# Patient Record
Sex: FEMALE | Race: WHITE | NOT HISPANIC OR LATINO | Employment: PART TIME | URBAN - METROPOLITAN AREA
[De-identification: names, ages, dates, MRNs, and addresses within clinical notes are randomized per-mention and may not be internally consistent; named-entity substitution may affect disease eponyms.]

---

## 2023-05-26 ENCOUNTER — OFFICE VISIT (OUTPATIENT)
Dept: PRIMARY CARE | Facility: CLINIC | Age: 28
End: 2023-05-26
Payer: COMMERCIAL

## 2023-05-26 VITALS
BODY MASS INDEX: 29.89 KG/M2 | DIASTOLIC BLOOD PRESSURE: 62 MMHG | SYSTOLIC BLOOD PRESSURE: 90 MMHG | WEIGHT: 186 LBS | OXYGEN SATURATION: 97 % | HEIGHT: 66 IN | HEART RATE: 70 BPM

## 2023-05-26 DIAGNOSIS — M25.512 CHRONIC PAIN OF BOTH SHOULDERS: ICD-10-CM

## 2023-05-26 DIAGNOSIS — L98.9 SKIN LESION: ICD-10-CM

## 2023-05-26 DIAGNOSIS — M79.7 FIBROMYALGIA: Primary | ICD-10-CM

## 2023-05-26 DIAGNOSIS — M25.511 CHRONIC PAIN OF BOTH SHOULDERS: ICD-10-CM

## 2023-05-26 DIAGNOSIS — E66.09 CLASS 1 OBESITY DUE TO EXCESS CALORIES WITHOUT SERIOUS COMORBIDITY WITH BODY MASS INDEX (BMI) OF 30.0 TO 30.9 IN ADULT: ICD-10-CM

## 2023-05-26 DIAGNOSIS — R20.2 PARESTHESIA OF BOTH HANDS: ICD-10-CM

## 2023-05-26 DIAGNOSIS — G89.29 CHRONIC PAIN OF BOTH SHOULDERS: ICD-10-CM

## 2023-05-26 PROBLEM — M25.551 HIP PAIN, RIGHT: Status: ACTIVE | Noted: 2023-05-26

## 2023-05-26 PROBLEM — D64.9 ANEMIA: Status: ACTIVE | Noted: 2023-05-26

## 2023-05-26 PROBLEM — L30.1 DYSHIDROTIC ECZEMA: Status: ACTIVE | Noted: 2023-05-26

## 2023-05-26 PROBLEM — R22.2 LUMP OF SKIN OF BACK: Status: ACTIVE | Noted: 2023-05-26

## 2023-05-26 PROBLEM — R53.83 FATIGUE: Status: ACTIVE | Noted: 2023-05-26

## 2023-05-26 PROBLEM — F41.9 ANXIETY DISORDER: Status: ACTIVE | Noted: 2023-05-26

## 2023-05-26 PROBLEM — N63.0 LUMP OR MASS IN BREAST: Status: ACTIVE | Noted: 2023-05-26

## 2023-05-26 PROBLEM — G43.909 MIGRAINE: Status: ACTIVE | Noted: 2023-05-26

## 2023-05-26 PROBLEM — N91.2 AMENORRHEA: Status: ACTIVE | Noted: 2023-05-26

## 2023-05-26 PROBLEM — M54.50 LOW BACK PAIN: Status: ACTIVE | Noted: 2023-05-26

## 2023-05-26 PROCEDURE — 99213 OFFICE O/P EST LOW 20 MIN: CPT | Performed by: NURSE PRACTITIONER

## 2023-05-26 PROCEDURE — 1036F TOBACCO NON-USER: CPT | Performed by: NURSE PRACTITIONER

## 2023-05-26 PROCEDURE — 3008F BODY MASS INDEX DOCD: CPT | Performed by: NURSE PRACTITIONER

## 2023-05-26 RX ORDER — CETIRIZINE HYDROCHLORIDE 10 MG/1
10 TABLET ORAL DAILY
COMMUNITY
Start: 2022-12-26

## 2023-05-26 RX ORDER — MOMETASONE FUROATE 1 MG/G
OINTMENT TOPICAL
COMMUNITY
Start: 2023-01-06 | End: 2023-05-26 | Stop reason: ALTCHOICE

## 2023-05-26 RX ORDER — RUXOLITINIB 15 MG/G
CREAM TOPICAL 2 TIMES DAILY
COMMUNITY

## 2023-05-26 ASSESSMENT — PATIENT HEALTH QUESTIONNAIRE - PHQ9
1. LITTLE INTEREST OR PLEASURE IN DOING THINGS: NOT AT ALL
2. FEELING DOWN, DEPRESSED OR HOPELESS: NOT AT ALL
SUM OF ALL RESPONSES TO PHQ9 QUESTIONS 1 AND 2: 0

## 2023-05-26 NOTE — PROGRESS NOTES
"Subjective   Patient ID: Al Nuñez is a 28 y.o. female who presents for check up and EMG follow up.      HPI  Al returns for follow up and concerns of still having shoulder pain. Also skin lesion concern.     Right shoulder pain/left shoulder pain/neck pain:  Reviewed EMG results with patient in regards to mild nerve entrapment to right. Reports that she is still having pain in both arms with numbness/tingling.       Skin lesion: left upper chest. Is worried would like to get it checked out. Reports it gets caught on things. Family history of skin cancer in mother.       Review of Systems   Constitutional:  Negative for fatigue.   Respiratory:  Negative for chest tightness and shortness of breath.    Cardiovascular:  Negative for chest pain, palpitations and leg swelling.   Gastrointestinal:  Negative for abdominal pain, blood in stool, constipation, diarrhea, nausea and vomiting.   Genitourinary:  Negative for dysuria.   Musculoskeletal:  Positive for arthralgias, myalgias and neck pain.   Skin:  Negative for color change.        Skin lesion right upper chest   Neurological:  Negative for dizziness, light-headedness and headaches.       Objective   Vitals:    05/26/23 1055   BP: 90/62   BP Location: Left arm   Pulse: 70   SpO2: 97%   Weight: 84.4 kg (186 lb)   Height: 1.676 m (5' 6\")      Physical Exam  Vitals and nursing note reviewed.   Constitutional:       Appearance: Normal appearance.   HENT:      Head: Normocephalic and atraumatic.   Pulmonary:      Effort: Pulmonary effort is normal.   Skin:            Comments: 0.5 cm brown colored skin lesion on right upper chest.    Neurological:      Mental Status: She is alert and oriented to person, place, and time.   Psychiatric:         Mood and Affect: Mood normal.         Behavior: Behavior normal.         Thought Content: Thought content normal.         Judgment: Judgment normal.         Assessment/Plan   Diagnoses and all orders for this " visit:  Fibromyalgia  Skin lesion  Comments:  advised her to call her dermatologist  Chronic pain of both shoulders  Paresthesia of both hands  Class 1 obesity due to excess calories without serious comorbidity with body mass index (BMI) of 30.0 to 30.9 in adult      Problem List Items Addressed This Visit       Chronic pain of both shoulders    Fibromyalgia - Primary    Paresthesia of both hands    Class 1 obesity due to excess calories without serious comorbidity with body mass index (BMI) of 30.0 to 30.9 in adult     Other Visit Diagnoses       Skin lesion        advised her to call her dermatologist          We discussed that we can send her to pain mgmt, she refused. We discussed alternative pain mgmt such as massage, chiropractor care, acupuncture.

## 2023-05-27 PROBLEM — E66.09 CLASS 1 OBESITY DUE TO EXCESS CALORIES WITHOUT SERIOUS COMORBIDITY WITH BODY MASS INDEX (BMI) OF 30.0 TO 30.9 IN ADULT: Status: ACTIVE | Noted: 2023-05-27

## 2023-05-27 PROBLEM — G54.0 BRACHIAL PLEXUS DISORDERS: Status: RESOLVED | Noted: 2018-03-06 | Resolved: 2023-05-27

## 2023-05-27 PROBLEM — R20.2 PARESTHESIA OF BOTH HANDS: Status: ACTIVE | Noted: 2018-03-06

## 2023-05-27 PROBLEM — G56.03 BILATERAL CARPAL TUNNEL SYNDROME: Status: RESOLVED | Noted: 2018-03-06 | Resolved: 2023-05-27

## 2023-05-27 PROBLEM — M75.20 BICEPS TENDINITIS: Status: RESOLVED | Noted: 2018-03-06 | Resolved: 2023-05-27

## 2023-05-27 PROBLEM — E66.811 CLASS 1 OBESITY DUE TO EXCESS CALORIES WITHOUT SERIOUS COMORBIDITY WITH BODY MASS INDEX (BMI) OF 30.0 TO 30.9 IN ADULT: Status: ACTIVE | Noted: 2023-05-27

## 2023-05-27 PROBLEM — R53.83 FATIGUE: Status: RESOLVED | Noted: 2023-05-26 | Resolved: 2023-05-27

## 2023-05-27 PROBLEM — M25.511 CHRONIC PAIN OF BOTH SHOULDERS: Status: ACTIVE | Noted: 2018-03-06

## 2023-05-27 PROBLEM — M25.512 CHRONIC PAIN OF BOTH SHOULDERS: Status: ACTIVE | Noted: 2018-03-06

## 2023-05-27 PROBLEM — K21.9 GASTROESOPHAGEAL REFLUX DISEASE: Status: ACTIVE | Noted: 2018-10-18

## 2023-05-27 PROBLEM — N63.0 LUMP OR MASS IN BREAST: Status: RESOLVED | Noted: 2023-05-26 | Resolved: 2023-05-27

## 2023-05-27 PROBLEM — M79.7 FIBROMYALGIA: Status: ACTIVE | Noted: 2018-03-06

## 2023-05-27 PROBLEM — M19.072 OSTEOARTHRITIS OF BOTH FEET: Status: RESOLVED | Noted: 2018-03-06 | Resolved: 2023-05-27

## 2023-05-27 PROBLEM — R09.89 CAROTID BRUIT: Status: RESOLVED | Noted: 2018-03-06 | Resolved: 2023-05-27

## 2023-05-27 PROBLEM — G89.29 CHRONIC PAIN OF BOTH SHOULDERS: Status: ACTIVE | Noted: 2018-03-06

## 2023-05-27 PROBLEM — M19.071 OSTEOARTHRITIS OF BOTH FEET: Status: RESOLVED | Noted: 2018-03-06 | Resolved: 2023-05-27

## 2023-05-27 PROBLEM — R22.2 LUMP OF SKIN OF BACK: Status: RESOLVED | Noted: 2023-05-26 | Resolved: 2023-05-27

## 2023-05-27 PROBLEM — M41.9 SCOLIOSIS: Status: RESOLVED | Noted: 2023-05-27 | Resolved: 2023-05-27

## 2023-05-27 ASSESSMENT — ENCOUNTER SYMPTOMS
HEADACHES: 0
DIZZINESS: 0
VOMITING: 0
FATIGUE: 0
DIARRHEA: 0
ABDOMINAL PAIN: 0
CONSTIPATION: 0
MYALGIAS: 1
NECK PAIN: 1
PALPITATIONS: 0
CHEST TIGHTNESS: 0
LIGHT-HEADEDNESS: 0
DYSURIA: 0
BLOOD IN STOOL: 0
COLOR CHANGE: 0
ARTHRALGIAS: 1
NAUSEA: 0
SHORTNESS OF BREATH: 0

## 2023-10-03 ENCOUNTER — OFFICE VISIT (OUTPATIENT)
Dept: PRIMARY CARE | Facility: CLINIC | Age: 28
End: 2023-10-03
Payer: COMMERCIAL

## 2023-10-03 ENCOUNTER — TELEPHONE (OUTPATIENT)
Dept: PRIMARY CARE | Facility: CLINIC | Age: 28
End: 2023-10-03

## 2023-10-03 VITALS
HEIGHT: 66 IN | BODY MASS INDEX: 29.89 KG/M2 | HEART RATE: 64 BPM | DIASTOLIC BLOOD PRESSURE: 60 MMHG | WEIGHT: 186 LBS | SYSTOLIC BLOOD PRESSURE: 110 MMHG

## 2023-10-03 DIAGNOSIS — R10.31 RIGHT LOWER QUADRANT ABDOMINAL PAIN: Primary | ICD-10-CM

## 2023-10-03 PROCEDURE — 99213 OFFICE O/P EST LOW 20 MIN: CPT | Performed by: NURSE PRACTITIONER

## 2023-10-03 PROCEDURE — 3008F BODY MASS INDEX DOCD: CPT | Performed by: NURSE PRACTITIONER

## 2023-10-03 PROCEDURE — 1036F TOBACCO NON-USER: CPT | Performed by: NURSE PRACTITIONER

## 2023-10-03 ASSESSMENT — ENCOUNTER SYMPTOMS
FLANK PAIN: 0
RESPIRATORY NEGATIVE: 1
CARDIOVASCULAR NEGATIVE: 1
MUSCULOSKELETAL NEGATIVE: 1
BLOOD IN STOOL: 0
NAUSEA: 0
DIFFICULTY URINATING: 0
CONSTIPATION: 1
HEMATOLOGIC/LYMPHATIC NEGATIVE: 1
VOMITING: 0
FREQUENCY: 0
DYSURIA: 0
RECTAL PAIN: 0
ABDOMINAL DISTENTION: 1
ABDOMINAL PAIN: 1
FEVER: 0
DIARRHEA: 0
PSYCHIATRIC NEGATIVE: 1

## 2023-10-03 NOTE — TELEPHONE ENCOUNTER
Al called in She did see [facility] today and they have her set up for an Ultrasound on Friday Am

## 2023-10-03 NOTE — PROGRESS NOTES
"Subjective   Patient ID: Al Nuñez is a 28 y.o. female who presents for Abdominal Pain.    HPI   Al returns for concern above.    Abdominal pain: having hard time passing gas for the last week.  Feeling bloated the past couple days. Has been also getting right sided lower abdominal pain intermittently. She reports it feels like someone is stabbing her. No fever. Denies any urinary discomfort. She reports she has been having Bms at least every day or every other day, but her stools are hard. She reports she will have to drink a cup of coffee or eat a bowel of fiber cereal to have a BM if she hasn't gone.     Having difficulty pain with sexual intercourse. Missed period last month.  had vasectomy.  Has appt with GYN at [facility] in [place] today at 1030.       Review of Systems   Constitutional:  Negative for fever.   HENT: Negative.     Respiratory: Negative.     Cardiovascular: Negative.    Gastrointestinal:  Positive for abdominal distention, abdominal pain and constipation. Negative for blood in stool, diarrhea, nausea, rectal pain and vomiting.   Genitourinary:  Positive for menstrual problem (missed period last month) and vaginal pain (with intercourse). Negative for difficulty urinating, dysuria, flank pain, frequency and urgency.   Musculoskeletal: Negative.    Skin: Negative.    Hematological: Negative.    Psychiatric/Behavioral: Negative.         Objective   /60 (BP Location: Left arm, Patient Position: Sitting)   Pulse 64   Ht 1.676 m (5' 6\")   Wt 84.4 kg (186 lb)   BMI 30.02 kg/m²     Physical Exam  Vitals and nursing note reviewed.   Constitutional:       Appearance: Normal appearance.   Cardiovascular:      Rate and Rhythm: Normal rate and regular rhythm.      Pulses: Normal pulses.      Heart sounds: Normal heart sounds.   Pulmonary:      Effort: Pulmonary effort is normal.      Breath sounds: Normal breath sounds.   Abdominal:      General: Bowel sounds are normal. There is " no distension.      Palpations: Abdomen is soft.      Tenderness: There is no abdominal tenderness. There is no right CVA tenderness, left CVA tenderness, guarding or rebound. Negative signs include Kumar's sign, Rovsing's sign and McBurney's sign.   Skin:     General: Skin is warm and dry.   Neurological:      General: No focal deficit present.      Mental Status: She is alert and oriented to person, place, and time.   Psychiatric:         Mood and Affect: Mood normal.         Behavior: Behavior normal.         Thought Content: Thought content normal.         Judgment: Judgment normal.         Assessment/Plan   Problem List Items Addressed This Visit    None  Visit Diagnoses         Codes    Right lower quadrant abdominal pain    -  Primary R10.31              She will see GYN today, if they do not believe it is GYN related we will do imaging of her abdomen. She is non-tender with palpation of abdomen, and is non-distended. Increase fiber in diet. Increase water intake. Take stool softeners or use MiraLax as needed for constipation.     Will follow up as needed. Return precautions discussed, warning signs discussed to warrant emergent visit. She verbalized understanding.

## 2023-11-08 ENCOUNTER — OFFICE VISIT (OUTPATIENT)
Dept: URGENT CARE | Facility: CLINIC | Age: 28
End: 2023-11-08
Payer: COMMERCIAL

## 2023-11-08 VITALS
DIASTOLIC BLOOD PRESSURE: 81 MMHG | WEIGHT: 186 LBS | HEIGHT: 66 IN | TEMPERATURE: 98 F | RESPIRATION RATE: 16 BRPM | HEART RATE: 65 BPM | BODY MASS INDEX: 29.89 KG/M2 | SYSTOLIC BLOOD PRESSURE: 119 MMHG | OXYGEN SATURATION: 97 %
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
## 2023-11-08 DIAGNOSIS — L25.8 CONTACT DERMATITIS DUE TO OTHER AGENT, UNSPECIFIED CONTACT DERMATITIS TYPE: ICD-10-CM

## 2023-11-08 DIAGNOSIS — S60.862A: Primary | ICD-10-CM

## 2023-11-08 PROCEDURE — 99212 OFFICE O/P EST SF 10 MIN: CPT | Performed by: PHYSICIAN ASSISTANT

## 2023-11-08 RX ORDER — PREDNISONE 10 MG/1
30 TABLET ORAL DAILY
Qty: 21 TABLET | Refills: 0 | Status: SHIPPED | OUTPATIENT
Start: 2023-11-08 | End: 2023-11-15

## 2023-11-08 RX ORDER — DOXYCYCLINE 100 MG/1
100 CAPSULE ORAL 2 TIMES DAILY
Qty: 14 CAPSULE | Refills: 0 | Status: SHIPPED | OUTPATIENT
Start: 2023-11-08 | End: 2023-11-15

## 2023-11-08 NOTE — PATIENT INSTRUCTIONS
What is dermatitis?  Dermatitis is a type of skin rash that can happen after your skin touches something that irritates it or something you are allergic to.    Things that irritate the skin can be found in products that you use every day, such as soaps or cleansers. Some of the things that can cause skin allergies include:    ?Certain medicines, perfumes, or cosmetics    ?The metal in some kinds of jewelry    ?Plants, such as poison ivy and poison oak    Sometimes, you can develop a rash the first time you touch something. But it is also possible to get a rash from something that you have used before without any problems.    What other symptoms should I watch for?  If you have a rash, your skin might be dry, itchy, or cracked (picture 1). In people with light skin, the rash is often red. In people with darker skin, it might appear purple, brown, gray, or black (picture 2). If your rash is caused by an allergy, you might also have some swelling or blisters where you have the rash.    Severe symptoms include:    ?Pain    ?Widespread swelling    ?Blisters, oozing, or crusting of the skin    Is there anything I can do on my own?  Yes. You can:    ?Avoid using or touching whatever might have caused your rash.    ?Protect your skin from anything that might irritate it or cause an allergy. For example, wear gloves if you need to work with harsh soaps.    ?Use cool or warm water, not hot, for baths and showers. You can also try a special kind of bath called an oatmeal bath.    ?Try using soothing skin products to help with the itching and discomfort. Examples include thick moisturizing cream or petroleum jelly. Put this on your skin right after you get out of the bath or shower and after washing your hands.    ?Avoid scratching your skin. It might help to:    Wear cotton gloves at night.    Keep your nails short and clean.    Cover the parts of your skin that itch.    How are skin rashes treated?  Your doctor might  prescribe different treatments or medicines to help your rash heal. These can include:    ?Steroid creams and ointments - These go on the skin, and they relieve itching and redness.    ?Steroid pills - You might need to take these for a short time if your rash is severe. But your doctor or nurse will want to take you off of the steroid pills as soon as possible. Even though these medicines help, they can also cause problems of their own.    ?Wet or damp dressings - These can be helpful for skin that is crusting or oozing. To use a wet or damp dressing, you need to wear 2 layers of clothing. First, put on a layer of damp cotton clothes over your rash. Then, put on a layer of dry clothes on top of the damp ones. People who need these dressings often wear them at night when they sleep.    When should I call the doctor?  Call your doctor or nurse for advice if:    ?You have a rash that does not go away within 2 weeks.    ?Your rash gets worse or spreads over large parts of your body.    ?You have signs of infection like swelling, warmth, pain, or fever.

## 2023-11-08 NOTE — PROGRESS NOTES
"[facility] URGENT CARE TIFFANIE NOTE:      Name: Al Nuñez, 28 y.o.    CSN:[id number]   MRN:[id number]    PCP: Jazmin Mcintyre, BRENDON-CNP    ALL:    Allergies   Allergen Reactions    Hydrocodone-Acetaminophen Other    Hydrocodone-Guaifenesin Unknown    Oxycodone-Acetaminophen Unknown       History:    Chief Complaint: Insect Bite (BUG BITE WITH SWELLING AND DISCOLORATION ON THE LEFT WRIST. PT STATES HIVES THAT RADIATE AROUND THE ABDOMINAL REGION AND EYE SWELLING.)    Encounter Date: 11/8/2023  10:30 AM    HPI: The history was obtained from the patient. Al is a 28 y.o. female, who presents with a chief complaint of Insect Bite (BUG BITE WITH SWELLING AND DISCOLORATION ON THE LEFT WRIST. PT STATES HIVES THAT RADIATE AROUND THE ABDOMINAL REGION AND EYE SWELLING.) Patient states she believes she was bit by something on Sunday. The next day she noticed her wrist was swollen and there was a blister at the bite, she pulled out \"two pinchers\". She did not see what happened. She states the pain and itchiness worsened over time at her wrist and has now spread to multiple areas around her body. She has hives across her lower abdomen, a small patch on her chest, several on her neck, and her eyes are both swollen. She works at a [employer] center.    She believes water makes the itching worse. Denies fever, nausea, numbness/tingling, myalgias.    She has been taking Benadryl at night, Zyrtec in the morning and using ice/benadryl cream throughout the day.    PMHx:    Past Medical History:   Diagnosis Date    Biceps tendinitis 03/06/2018    Brachial plexus disorders 03/06/2018    Last Assessment & Plan: Formatting of this note might be different from the original. Tylenol as needed for pain Ice or heat to area whichever feels better Call [facility] to make an appt with a specialist, no referral needed    Carotid bruit 03/06/2018    Encounter for full-term uncomplicated delivery     Normal " vaginal delivery    Encounter for routine postpartum follow-up 12/21/2021    Postpartum follow-up    Fatigue 05/26/2023    Mastodynia 12/10/2021    Breast pain, left    Osteoarthritis of both feet 03/06/2018    Other conditions influencing health status     Menstruation    Other specified pregnancy related conditions, unspecified trimester 08/31/2021    Shortness of breath with pregnancy    Persons encountering health services in other specified circumstances 08/30/2021    Encounter to establish care    RLS (restless legs syndrome) 07/20/2013    Formatting of this note might be different from the original. Iron ok Could not afford mirapex.    Scoliosis 05/27/2023              Current Outpatient Medications   Medication Sig Dispense Refill    cetirizine (ZyrTEC) 10 mg tablet Take 1 tablet (10 mg) by mouth once daily.      doxycycline (Monodox) 100 mg capsule Take 1 capsule (100 mg) by mouth 2 times a day for 7 days. Take with at least 8 ounces (large glass) of water, do not lie down for 30 minutes after 14 capsule 0    predniSONE (Deltasone) 10 mg tablet Take 3 tablets (30 mg) by mouth once daily for 7 days. 21 tablet 0    ruxolitinib (Opzelura) 1.5 % cream cream Apply topically 2 times a day.       No current facility-administered medications for this visit.         PMSx:    Past Surgical History:   Procedure Laterality Date    OTHER SURGICAL HISTORY  08/30/2021    Dilation and curettage    OTHER SURGICAL HISTORY  08/30/2021    Bunionectomy    OTHER SURGICAL HISTORY  08/30/2021    Adenoidectomy    OTHER SURGICAL HISTORY  08/30/2021    [place] tooth extraction    OTHER SURGICAL HISTORY  08/30/2021    Tonsillectomy       Fam Hx:   Family History   Problem Relation Name Age of Onset    Hypertension Mother      Depression Father      Depression Sister      Breast cancer Father's Sister      Diabetes Maternal Grandfather         SOC. Hx:     Social History     Socioeconomic History    Marital status:      Spouse  name: Not on file    Number of children: Not on file    Years of education: Not on file    Highest education level: Not on file   Occupational History    Not on file   Tobacco Use    Smoking status: Never    Smokeless tobacco: Never   Substance and Sexual Activity    Alcohol use: Not on file    Drug use: Not on file    Sexual activity: Not on file   Other Topics Concern    Not on file   Social History Narrative    Not on file     Social Determinants of Health     Financial Resource Strain: Not on file   Food Insecurity: Not on file   Transportation Needs: Not on file   Physical Activity: Not on file   Stress: Not on file   Social Connections: Not on file   Intimate Partner Violence: Not on file   Housing Stability: Not on file         Vitals:    11/08/23 1021   BP: 119/81   Pulse: 65   Resp: 16   Temp: 36.7 °C (98 °F)   SpO2: 97%     84.4 kg (186 lb)          Physical Exam  Vitals and nursing note reviewed.   Cardiovascular:      Rate and Rhythm: Normal rate and regular rhythm.   Pulmonary:      Effort: Pulmonary effort is normal.      Breath sounds: Normal breath sounds.   Skin:            Comments: Several urticarial lesions grouped across lower abdomen, chest, and neck   Neurological:      Mental Status: She is alert.      Sensory: No sensory deficit.      Motor: No weakness.           LABORATORY @ RADIOLOGICAL IMAGING (if done):     No results found for this or any previous visit (from the past 24 hour(s)).    UC COURSE/MEDICAL DECISION MAKING:    Al is a 28 y.o., who presents with a working diagnosis of   1. Nonvenomous insect bite of left wrist, initial encounter    2. Contact dermatitis due to other agent, unspecified contact dermatitis type       Patient was given the 60 mg Solu-Medrol in the office, discussed treatment plan to include use of prednisone, and doxycycline) for any bacterial source secondary to these lesions.    Patient has not patient seen in financial coverage previous medically has care  source and she is requesting to have reduced medical expenses if possible and just treat the symptoms at this time.      NENITA Mariee-Student  Sourav Romeo PA-C   Advanced Practice Provider  [facility] URGENT CARE

## 2024-01-05 ENCOUNTER — ANCILLARY PROCEDURE (OUTPATIENT)
Dept: RADIOLOGY | Facility: CLINIC | Age: 29
End: 2024-01-05
Payer: COMMERCIAL

## 2024-01-05 ENCOUNTER — OFFICE VISIT (OUTPATIENT)
Dept: PRIMARY CARE | Facility: CLINIC | Age: 29
End: 2024-01-05
Payer: COMMERCIAL

## 2024-01-05 VITALS
DIASTOLIC BLOOD PRESSURE: 70 MMHG | HEIGHT: 66 IN | BODY MASS INDEX: 30.92 KG/M2 | HEART RATE: 87 BPM | WEIGHT: 192.38 LBS | SYSTOLIC BLOOD PRESSURE: 118 MMHG

## 2024-01-05 DIAGNOSIS — M79.641 RIGHT HAND PAIN: ICD-10-CM

## 2024-01-05 DIAGNOSIS — M79.641 RIGHT HAND PAIN: Primary | ICD-10-CM

## 2024-01-05 PROCEDURE — 99213 OFFICE O/P EST LOW 20 MIN: CPT | Performed by: NURSE PRACTITIONER

## 2024-01-05 PROCEDURE — 73120 X-RAY EXAM OF HAND: CPT | Mod: RT

## 2024-01-05 PROCEDURE — 1036F TOBACCO NON-USER: CPT | Performed by: NURSE PRACTITIONER

## 2024-01-05 PROCEDURE — 3008F BODY MASS INDEX DOCD: CPT | Performed by: NURSE PRACTITIONER

## 2024-01-05 PROCEDURE — 73120 X-RAY EXAM OF HAND: CPT | Mod: RIGHT SIDE | Performed by: RADIOLOGY

## 2024-01-05 ASSESSMENT — ENCOUNTER SYMPTOMS
NAUSEA: 0
HEADACHES: 0
BLOOD IN STOOL: 0
PALPITATIONS: 0
DIZZINESS: 0
CHEST TIGHTNESS: 0
DYSURIA: 0
PSYCHIATRIC NEGATIVE: 1
CONSTIPATION: 0
FATIGUE: 0
ARTHRALGIAS: 1
DIARRHEA: 0
LIGHT-HEADEDNESS: 0
MYALGIAS: 0
VOMITING: 0
SHORTNESS OF BREATH: 0
COLOR CHANGE: 0
ABDOMINAL PAIN: 0

## 2024-01-05 NOTE — PROGRESS NOTES
"Subjective   Patient ID: Al Nuñez is a 28 y.o. female who presents for Hand Pain.    HPI   Al returns for complaints of above.    Right Hand pain:  hit a wall about a month ago on accident when swinging her hands when talking. She reports the area swelled and bruised but improved.  Unable to do some movements, like picking up her daughter.  Has pain on dorsal side of hand in metacarpal area down middle finger. Lump noted.      Review of Systems   Constitutional:  Negative for fatigue.   HENT: Negative.     Respiratory:  Negative for chest tightness and shortness of breath.    Cardiovascular:  Negative for chest pain, palpitations and leg swelling.   Gastrointestinal:  Negative for abdominal pain, blood in stool, constipation, diarrhea, nausea and vomiting.   Genitourinary:  Negative for dysuria.   Musculoskeletal:  Positive for arthralgias (right hand pain). Negative for myalgias.   Skin:  Negative for color change.   Neurological:  Negative for dizziness, light-headedness and headaches.   Psychiatric/Behavioral: Negative.         Objective   /70   Pulse 87   Ht 1.676 m (5' 6\")   Wt 87.3 kg (192 lb 6 oz)   BMI 31.05 kg/m²     Physical Exam  Vitals and nursing note reviewed.   Constitutional:       Appearance: Normal appearance.   HENT:      Head: Normocephalic and atraumatic.   Pulmonary:      Effort: Pulmonary effort is normal.   Musculoskeletal:      Right hand: Swelling and tenderness present. Decreased range of motion. Decreased strength. Normal capillary refill.        Arms:       Comments: Lump noted to mid dorsal area of hand.    Skin:     Capillary Refill: Capillary refill takes less than 2 seconds.   Neurological:      General: No focal deficit present.      Mental Status: She is alert and oriented to person, place, and time.   Psychiatric:         Mood and Affect: Mood normal.         Behavior: Behavior normal.         Thought Content: Thought content normal.         Judgment: Judgment " normal.         Assessment/Plan   Problem List Items Addressed This Visit    None  Visit Diagnoses         Codes    Right hand pain    -  Primary M79.641    Relevant Orders    XR hand right 1-2 views              Follow up as needed. We will call with results.

## 2024-01-08 NOTE — RESULT ENCOUNTER NOTE
Let her know her hand xray was normal. It did not show any fractures or any soft tissue abnormalities. It may just be a bruise/contusion which should get better with time. She can try heat to the area to see if it will improve. If symptoms persist, we can send her to a specialist.

## 2024-02-05 ENCOUNTER — OFFICE VISIT (OUTPATIENT)
Dept: PRIMARY CARE | Facility: CLINIC | Age: 29
End: 2024-02-05
Payer: COMMERCIAL

## 2024-02-05 VITALS
HEART RATE: 92 BPM | DIASTOLIC BLOOD PRESSURE: 70 MMHG | SYSTOLIC BLOOD PRESSURE: 110 MMHG | HEIGHT: 66 IN | WEIGHT: 195.38 LBS | BODY MASS INDEX: 31.4 KG/M2

## 2024-02-05 DIAGNOSIS — F41.1 GENERALIZED ANXIETY DISORDER: Primary | ICD-10-CM

## 2024-02-05 DIAGNOSIS — E66.09 CLASS 1 OBESITY DUE TO EXCESS CALORIES WITHOUT SERIOUS COMORBIDITY WITH BODY MASS INDEX (BMI) OF 30.0 TO 30.9 IN ADULT: ICD-10-CM

## 2024-02-05 PROCEDURE — 99213 OFFICE O/P EST LOW 20 MIN: CPT | Performed by: NURSE PRACTITIONER

## 2024-02-05 PROCEDURE — 3008F BODY MASS INDEX DOCD: CPT | Performed by: NURSE PRACTITIONER

## 2024-02-05 PROCEDURE — 1036F TOBACCO NON-USER: CPT | Performed by: NURSE PRACTITIONER

## 2024-02-05 RX ORDER — ESCITALOPRAM OXALATE 5 MG/1
5 TABLET ORAL DAILY
Qty: 30 TABLET | Refills: 1 | Status: SHIPPED | OUTPATIENT
Start: 2024-02-05 | End: 2024-03-04 | Stop reason: SDUPTHER

## 2024-02-05 ASSESSMENT — PATIENT HEALTH QUESTIONNAIRE - PHQ9
SUM OF ALL RESPONSES TO PHQ9 QUESTIONS 1 AND 2: 0
2. FEELING DOWN, DEPRESSED OR HOPELESS: NOT AT ALL
1. LITTLE INTEREST OR PLEASURE IN DOING THINGS: NOT AT ALL

## 2024-02-05 ASSESSMENT — ENCOUNTER SYMPTOMS
LIGHT-HEADEDNESS: 0
NERVOUS/ANXIOUS: 1
PALPITATIONS: 0
NAUSEA: 0
DIARRHEA: 0
VOMITING: 0
CHEST TIGHTNESS: 0
ARTHRALGIAS: 0
SLEEP DISTURBANCE: 0
CONSTIPATION: 0
DYSURIA: 0
ABDOMINAL PAIN: 0
DIZZINESS: 0
FATIGUE: 0
COLOR CHANGE: 0
DYSPHORIC MOOD: 1
BLOOD IN STOOL: 0
HEADACHES: 0
MYALGIAS: 0
SHORTNESS OF BREATH: 0

## 2024-02-05 ASSESSMENT — COLUMBIA-SUICIDE SEVERITY RATING SCALE - C-SSRS
2. HAVE YOU ACTUALLY HAD ANY THOUGHTS OF KILLING YOURSELF?: NO
1. IN THE PAST MONTH, HAVE YOU WISHED YOU WERE DEAD OR WISHED YOU COULD GO TO SLEEP AND NOT WAKE UP?: NO

## 2024-02-05 NOTE — PROGRESS NOTES
"Subjective   Patient ID: Al Nuñez is a 29 y.o. female who presents for Anxiety.    Anxiety  Symptoms include nervous/anxious behavior. Patient reports no chest pain, dizziness, nausea, palpitations, shortness of breath or suicidal ideas.          Al returns for complaints of anxiety.    Anxiety: experiencing anxiety at work, and periods of irritability at home. She has been on medication in the past, Cymbalta, fluoxetine, lexapro but she feels she didn't really give a chance to really work.  She is doing journaling, mediation, yoga, counseling. Has not been helping by itself. She feels she needs to be on medication now. Denies suicidal ideations. She has family history of anxiety in her dad, mother, maternal grandmother.       Review of Systems   Constitutional:  Negative for fatigue.   Respiratory:  Negative for chest tightness and shortness of breath.    Cardiovascular:  Negative for chest pain, palpitations and leg swelling.   Gastrointestinal:  Negative for abdominal pain, blood in stool, constipation, diarrhea, nausea and vomiting.   Genitourinary:  Negative for dysuria.   Musculoskeletal:  Negative for arthralgias and myalgias.   Skin:  Negative for color change.   Neurological:  Negative for dizziness, light-headedness and headaches.   Psychiatric/Behavioral:  Positive for dysphoric mood. Negative for self-injury, sleep disturbance and suicidal ideas. The patient is nervous/anxious.        Objective   /70   Pulse 92   Ht 1.676 m (5' 6\")   Wt 88.6 kg (195 lb 6 oz)   BMI 31.53 kg/m²     Physical Exam  Vitals and nursing note reviewed.   Constitutional:       Appearance: Normal appearance.   Pulmonary:      Effort: Pulmonary effort is normal.   Skin:     General: Skin is warm.   Neurological:      General: No focal deficit present.      Mental Status: She is alert and oriented to person, place, and time.   Psychiatric:         Mood and Affect: Mood normal.         Behavior: Behavior normal.    "      Thought Content: Thought content normal.         Judgment: Judgment normal.         Assessment/Plan   Problem List Items Addressed This Visit             ICD-10-CM    Anxiety disorder - Primary F41.9     Start escitalopram 5 mg daily         Relevant Medications    escitalopram (Lexapro) 5 mg tablet    Class 1 obesity due to excess calories without serious comorbidity with body mass index (BMI) of 30.0 to 30.9 in adult E66.09, Z68.30          Follow up in 1 month for medication check.

## 2024-02-05 NOTE — PATIENT INSTRUCTIONS
No recent mental health visit.   Medications: start Escitalopram 5 mg daily.  If taking medications, do not stop treatment without consulting healthcare provider.   If symptoms worsen or do not improve/stabilize, notify health care provider right away.   If thoughts of harming self or others notify health care provider immediately &/or seek urgent/emergent care including calling Suicide Hotline (762 or 1-474.113.9642) or 814.

## 2024-02-06 ENCOUNTER — APPOINTMENT (OUTPATIENT)
Dept: PRIMARY CARE | Facility: CLINIC | Age: 29
End: 2024-02-06
Payer: COMMERCIAL

## 2024-02-08 ENCOUNTER — APPOINTMENT (OUTPATIENT)
Dept: PRIMARY CARE | Facility: CLINIC | Age: 29
End: 2024-02-08
Payer: COMMERCIAL

## 2024-03-04 ENCOUNTER — OFFICE VISIT (OUTPATIENT)
Dept: PRIMARY CARE | Facility: CLINIC | Age: 29
End: 2024-03-04
Payer: COMMERCIAL

## 2024-03-04 VITALS
DIASTOLIC BLOOD PRESSURE: 70 MMHG | SYSTOLIC BLOOD PRESSURE: 118 MMHG | WEIGHT: 195.5 LBS | HEIGHT: 66 IN | BODY MASS INDEX: 31.42 KG/M2 | HEART RATE: 71 BPM

## 2024-03-04 DIAGNOSIS — F34.1 PERSISTENT DEPRESSIVE DISORDER: ICD-10-CM

## 2024-03-04 DIAGNOSIS — F41.1 GENERALIZED ANXIETY DISORDER: Primary | ICD-10-CM

## 2024-03-04 DIAGNOSIS — E66.09 CLASS 1 OBESITY DUE TO EXCESS CALORIES WITHOUT SERIOUS COMORBIDITY WITH BODY MASS INDEX (BMI) OF 31.0 TO 31.9 IN ADULT: ICD-10-CM

## 2024-03-04 PROCEDURE — 1036F TOBACCO NON-USER: CPT | Performed by: NURSE PRACTITIONER

## 2024-03-04 PROCEDURE — 99213 OFFICE O/P EST LOW 20 MIN: CPT | Performed by: NURSE PRACTITIONER

## 2024-03-04 PROCEDURE — 3008F BODY MASS INDEX DOCD: CPT | Performed by: NURSE PRACTITIONER

## 2024-03-04 RX ORDER — ESCITALOPRAM OXALATE 5 MG/1
5 TABLET ORAL DAILY
Qty: 30 TABLET | Refills: 5 | Status: SHIPPED | OUTPATIENT
Start: 2024-03-04

## 2024-03-04 ASSESSMENT — ANXIETY QUESTIONNAIRES
4. TROUBLE RELAXING: SEVERAL DAYS
5. BEING SO RESTLESS THAT IT IS HARD TO SIT STILL: NOT AT ALL
6. BECOMING EASILY ANNOYED OR IRRITABLE: NOT AT ALL
2. NOT BEING ABLE TO STOP OR CONTROL WORRYING: NOT AT ALL
7. FEELING AFRAID AS IF SOMETHING AWFUL MIGHT HAPPEN: NOT AT ALL
1. FEELING NERVOUS, ANXIOUS, OR ON EDGE: NOT AT ALL
GAD7 TOTAL SCORE: 2
3. WORRYING TOO MUCH ABOUT DIFFERENT THINGS: SEVERAL DAYS

## 2024-03-04 ASSESSMENT — ENCOUNTER SYMPTOMS
PALPITATIONS: 0
MYALGIAS: 0
FATIGUE: 0
DIARRHEA: 0
ARTHRALGIAS: 0
PSYCHIATRIC NEGATIVE: 1
SHORTNESS OF BREATH: 0
DIZZINESS: 0
CHEST TIGHTNESS: 0
NAUSEA: 0
VOMITING: 0
ABDOMINAL PAIN: 0
CONSTIPATION: 0
HEADACHES: 0
BLOOD IN STOOL: 0
DYSURIA: 0
LIGHT-HEADEDNESS: 0
COLOR CHANGE: 0

## 2024-03-04 NOTE — LETTER
March 4, 2024     Patient: Al Nuñez   YOB: 1995   Date of Visit: 3/4/2024       To Whom It May Concern:    Al Nuñez was seen in my clinic on 3/4/2024 at 11:00 am. Please excuse Al for her absence from work on this day to make the appointment.    If you have any questions or concerns, please don't hesitate to call.         Sincerely,         BRENDON Jerez-CNP        CC: No Recipients

## 2024-03-04 NOTE — PROGRESS NOTES
"Subjective   Patient ID: Al Nuñez is a 29 y.o. female who presents for Follow-up.    HPI   Al returns for medication follow up.    Anxiety/depression: doing well on lexapro. Denies SI/HI.       Review of Systems   Constitutional:  Negative for fatigue.   Respiratory:  Negative for chest tightness and shortness of breath.    Cardiovascular:  Negative for chest pain, palpitations and leg swelling.   Gastrointestinal:  Negative for abdominal pain, blood in stool, constipation, diarrhea, nausea and vomiting.   Genitourinary:  Negative for dysuria.   Musculoskeletal:  Negative for arthralgias and myalgias.   Skin:  Negative for color change.   Neurological:  Negative for dizziness, light-headedness and headaches.   Psychiatric/Behavioral: Negative.         Objective   /70   Pulse 71   Ht 1.676 m (5' 6\")   Wt 88.7 kg (195 lb 8 oz)   BMI 31.55 kg/m²     Physical Exam  Constitutional:       Appearance: Normal appearance.   HENT:      Head: Normocephalic and atraumatic.   Pulmonary:      Effort: Pulmonary effort is normal.   Musculoskeletal:         General: Normal range of motion.      Cervical back: Normal range of motion.   Neurological:      General: No focal deficit present.      Mental Status: She is alert and oriented to person, place, and time.   Psychiatric:         Mood and Affect: Mood normal.         Behavior: Behavior normal.         Assessment/Plan   Problem List Items Addressed This Visit             ICD-10-CM    Generalized anxiety disorder - Primary F41.1     Continue lexapro 5 mg daily - refilled         Relevant Medications    escitalopram (Lexapro) 5 mg tablet    Persistent depressive disorder F34.1     Continue Lexapro 5 mg daily - refilled         Class 1 obesity due to excess calories without serious comorbidity with body mass index (BMI) of 31.0 to 31.9 in adult E66.09, Z68.31     Pt advised to institute a healthy, well-balanced meal with portion control and to exercise daily for at " least 30-60 minutes.              Follow up in 6 months for medication refills.

## 2024-04-01 ENCOUNTER — OFFICE VISIT (OUTPATIENT)
Dept: PRIMARY CARE | Facility: CLINIC | Age: 29
End: 2024-04-01
Payer: COMMERCIAL

## 2024-04-01 VITALS
HEART RATE: 65 BPM | HEIGHT: 66 IN | DIASTOLIC BLOOD PRESSURE: 60 MMHG | BODY MASS INDEX: 32.34 KG/M2 | SYSTOLIC BLOOD PRESSURE: 110 MMHG | WEIGHT: 201.25 LBS

## 2024-04-01 DIAGNOSIS — H60.501 ACUTE OTITIS EXTERNA OF RIGHT EAR, UNSPECIFIED TYPE: Primary | ICD-10-CM

## 2024-04-01 PROCEDURE — 3008F BODY MASS INDEX DOCD: CPT | Performed by: NURSE PRACTITIONER

## 2024-04-01 PROCEDURE — 1036F TOBACCO NON-USER: CPT | Performed by: NURSE PRACTITIONER

## 2024-04-01 PROCEDURE — 99213 OFFICE O/P EST LOW 20 MIN: CPT | Performed by: NURSE PRACTITIONER

## 2024-04-01 RX ORDER — CIPROFLOXACIN AND DEXAMETHASONE 3; 1 MG/ML; MG/ML
4 SUSPENSION/ DROPS AURICULAR (OTIC) 2 TIMES DAILY
Qty: 7.5 ML | Refills: 0 | Status: SHIPPED | OUTPATIENT
Start: 2024-04-01 | End: 2024-04-08

## 2024-04-01 ASSESSMENT — ENCOUNTER SYMPTOMS
SINUS PRESSURE: 0
FEVER: 0
RHINORRHEA: 0
SINUS PAIN: 0

## 2024-04-01 NOTE — PROGRESS NOTES
"Subjective   Patient ID: Al Nuñez is a 29 y.o. female who presents for Ear Fullness.    HPI   Al returns for above complaints.    Right ear pain: started Last week, feels itchy. Feels like it needs to pop, will try to \"pop\" but it won't pop. She reports she hasn't started taking her allergy medicine yet because it will usually give her a bloody nose after a while of using it. She also reports no recent illness.  Does admit to recent hot tub use.    She reports she has noticed over the years, she can't \"hear\" people when they are speaking to her in a noisy room. She states she had her hearing checked in the past, and has been told everything was fine. She reports she was putting her daughter in the car and her  was talking to her. When she turned around and looked at him after buckling her in, he asked her why she didn't answer, and she stated she couldn't hear him. She feels this has been an ongoing problem for \"years\" and \"will look at people's lips when they are talking.\"       Review of Systems   Constitutional:  Negative for fever.   HENT:  Positive for ear pain. Negative for congestion, dental problem, ear discharge, hearing loss, postnasal drip, rhinorrhea, sinus pressure, sinus pain and tinnitus.        Objective   /60   Pulse 65   Ht 1.676 m (5' 6\")   Wt 91.3 kg (201 lb 4 oz)   BMI 32.48 kg/m²     Physical Exam  Vitals and nursing note reviewed.   Constitutional:       Appearance: Normal appearance.   HENT:      Right Ear: Tympanic membrane and external ear normal. Swelling present. No middle ear effusion. Tympanic membrane is not injected, scarred, perforated or erythematous.      Left Ear: Ear canal and external ear normal. Tympanic membrane is scarred.      Ears:        Comments: Swelling and redness noted in right ear canal. Scarring noted to left TM from tubes.   Neurological:      Mental Status: She is alert.         Assessment/Plan   Problem List Items Addressed This Visit  "   None  Visit Diagnoses         Codes    Acute otitis externa of right ear, unspecified type    -  Primary H60.501    Relevant Medications    ciprofloxacin-dexamethasone (CiproDEX) otic suspension                Follow up as needed or if symptoms do not improve.

## 2024-04-01 NOTE — LETTER
April 1, 2024     Patient: Al Nuñez   YOB: 1995   Date of Visit: 4/1/2024       To Whom It May Concern:    Al Nuñez was seen in my clinic on 4/1/2024 at 11:20 am. Please excuse Al for her absence from work on this day to make the appointment.    If you have any questions or concerns, please don't hesitate to call.         Sincerely,         BRENDON Jerez-CNP        CC: No Recipients

## 2024-04-05 ENCOUNTER — TELEPHONE (OUTPATIENT)
Dept: PRIMARY CARE | Facility: CLINIC | Age: 29
End: 2024-04-05

## 2024-04-05 NOTE — TELEPHONE ENCOUNTER
Al called in and Lm that she is having problems with her ear drops unable to keep them in ear canal, ( She had called before and I instructed her to use a cotton ball)  She states nothing is working  and her ear is no better

## 2024-07-22 ENCOUNTER — TELEPHONE (OUTPATIENT)
Dept: PRIMARY CARE | Facility: CLINIC | Age: 29
End: 2024-07-22
Payer: COMMERCIAL

## 2024-07-22 DIAGNOSIS — F41.1 GENERALIZED ANXIETY DISORDER: ICD-10-CM

## 2024-07-22 RX ORDER — ESCITALOPRAM OXALATE 10 MG/1
10 TABLET ORAL DAILY
Qty: 30 TABLET | Refills: 5 | Status: SHIPPED | OUTPATIENT
Start: 2024-07-22

## 2024-08-20 ENCOUNTER — APPOINTMENT (OUTPATIENT)
Age: 29
End: 2024-08-20
Payer: COMMERCIAL

## 2024-08-20 VITALS
HEIGHT: 66 IN | WEIGHT: 198 LBS | HEART RATE: 71 BPM | BODY MASS INDEX: 31.82 KG/M2 | SYSTOLIC BLOOD PRESSURE: 120 MMHG | DIASTOLIC BLOOD PRESSURE: 70 MMHG

## 2024-08-20 DIAGNOSIS — F41.1 GENERALIZED ANXIETY DISORDER: Primary | ICD-10-CM

## 2024-08-20 DIAGNOSIS — F34.1 PERSISTENT DEPRESSIVE DISORDER: ICD-10-CM

## 2024-08-20 DIAGNOSIS — E66.09 CLASS 1 OBESITY DUE TO EXCESS CALORIES WITHOUT SERIOUS COMORBIDITY WITH BODY MASS INDEX (BMI) OF 31.0 TO 31.9 IN ADULT: ICD-10-CM

## 2024-08-20 PROCEDURE — 1036F TOBACCO NON-USER: CPT | Performed by: NURSE PRACTITIONER

## 2024-08-20 PROCEDURE — 3008F BODY MASS INDEX DOCD: CPT | Performed by: NURSE PRACTITIONER

## 2024-08-20 PROCEDURE — 99213 OFFICE O/P EST LOW 20 MIN: CPT | Performed by: NURSE PRACTITIONER

## 2024-08-20 RX ORDER — BUSPIRONE HYDROCHLORIDE 5 MG/1
5 TABLET ORAL 3 TIMES DAILY PRN
Qty: 90 TABLET | Refills: 0 | Status: SHIPPED | OUTPATIENT
Start: 2024-08-20

## 2024-08-20 RX ORDER — ESCITALOPRAM OXALATE 5 MG/1
5 TABLET ORAL DAILY
Qty: 90 TABLET | Refills: 0 | Status: SHIPPED | OUTPATIENT
Start: 2024-08-20

## 2024-08-20 ASSESSMENT — ENCOUNTER SYMPTOMS
BLOOD IN STOOL: 0
DIZZINESS: 0
NAUSEA: 0
FATIGUE: 0
COLOR CHANGE: 0
ABDOMINAL PAIN: 0
MYALGIAS: 0
NERVOUS/ANXIOUS: 1
DECREASED CONCENTRATION: 0
SHORTNESS OF BREATH: 0
CONSTIPATION: 0
LIGHT-HEADEDNESS: 0
DYSPHORIC MOOD: 0
DYSURIA: 0
ARTHRALGIAS: 0
VOMITING: 0
DIARRHEA: 0
CHEST TIGHTNESS: 0
HEADACHES: 0
PALPITATIONS: 0

## 2024-08-20 NOTE — PROGRESS NOTES
"Subjective   Patient ID: Al Nuñez is a 29 y.o. female who presents for Follow-up.    HPI   Al returns for medication follow up.    Anxiety/depression:  has been on Escitalopram 10 mg. However she felt that she was doing better on the 5 mg. She felt that the increase of the 10 mg made her feel like she didn't care about anything. She also reports sexual side effects with the increased dose. She feels that she needs something not for everyday but more for just sometimes as needed to help with her symptoms of overwhelmed while she is learning/navigating her new position at work.       Review of Systems   Constitutional:  Negative for fatigue.   Respiratory:  Negative for chest tightness and shortness of breath.    Cardiovascular:  Negative for chest pain, palpitations and leg swelling.   Gastrointestinal:  Negative for abdominal pain, blood in stool, constipation, diarrhea, nausea and vomiting.   Genitourinary:  Negative for dysuria.   Musculoskeletal:  Negative for arthralgias and myalgias.   Skin:  Negative for color change.   Neurological:  Negative for dizziness, light-headedness and headaches.   Psychiatric/Behavioral:  Negative for decreased concentration and dysphoric mood. The patient is nervous/anxious (some days).        Objective   /70   Pulse 71   Ht 1.676 m (5' 6\")   Wt 89.8 kg (198 lb)   BMI 31.96 kg/m²     Physical Exam  Vitals and nursing note reviewed.   Constitutional:       Appearance: Normal appearance.   HENT:      Head: Normocephalic and atraumatic.   Cardiovascular:      Rate and Rhythm: Normal rate.   Pulmonary:      Effort: Pulmonary effort is normal.   Musculoskeletal:      Cervical back: Normal range of motion.   Neurological:      General: No focal deficit present.      Mental Status: She is alert and oriented to person, place, and time.   Psychiatric:         Mood and Affect: Mood normal.         Behavior: Behavior normal.         Thought Content: Thought content normal.   "       Judgment: Judgment normal.         Assessment/Plan   Problem List Items Addressed This Visit             ICD-10-CM    Generalized anxiety disorder - Primary F41.1     Continue lexapro 5 mg daily   Will add Buspirone 5 mg TID as needed         Relevant Medications    escitalopram (Lexapro) 5 mg tablet    busPIRone (Buspar) 5 mg tablet    Persistent depressive disorder F34.1     Continue with escitalopram 5 mg daily          Class 1 obesity due to excess calories without serious comorbidity with body mass index (BMI) of 31.0 to 31.9 in adult E66.09, Z68.31         Follow up in 6 months. She will let us know how she is doing on buspirone if she does not tolerate it.     For any new medications that were prescribed today, the patient was educated about their indications for use, administration, frequency and potential side effects of the medication.

## 2024-09-09 ENCOUNTER — APPOINTMENT (OUTPATIENT)
Dept: PRIMARY CARE | Facility: CLINIC | Age: 29
End: 2024-09-09
Payer: COMMERCIAL

## 2024-11-15 DIAGNOSIS — F41.1 GENERALIZED ANXIETY DISORDER: ICD-10-CM

## 2024-11-15 RX ORDER — ESCITALOPRAM OXALATE 5 MG/1
5 TABLET ORAL DAILY
Qty: 90 TABLET | Refills: 1 | Status: SHIPPED | OUTPATIENT
Start: 2024-11-15

## 2025-02-17 ENCOUNTER — APPOINTMENT (OUTPATIENT)
Dept: PRIMARY CARE | Facility: CLINIC | Age: 30
End: 2025-02-17
Payer: COMMERCIAL

## 2025-03-24 ENCOUNTER — APPOINTMENT (OUTPATIENT)
Dept: PRIMARY CARE | Facility: CLINIC | Age: 30
End: 2025-03-24

## 2025-04-11 ENCOUNTER — APPOINTMENT (OUTPATIENT)
Dept: PRIMARY CARE | Facility: CLINIC | Age: 30
End: 2025-04-11

## 2025-04-11 VITALS
HEART RATE: 71 BPM | DIASTOLIC BLOOD PRESSURE: 80 MMHG | SYSTOLIC BLOOD PRESSURE: 104 MMHG | BODY MASS INDEX: 33.46 KG/M2 | HEIGHT: 66 IN | WEIGHT: 208.19 LBS

## 2025-04-11 DIAGNOSIS — F34.1 PERSISTENT DEPRESSIVE DISORDER: ICD-10-CM

## 2025-04-11 DIAGNOSIS — F41.1 GENERALIZED ANXIETY DISORDER: Primary | ICD-10-CM

## 2025-04-11 DIAGNOSIS — E66.09 CLASS 1 OBESITY DUE TO EXCESS CALORIES WITHOUT SERIOUS COMORBIDITY WITH BODY MASS INDEX (BMI) OF 33.0 TO 33.9 IN ADULT: ICD-10-CM

## 2025-04-11 DIAGNOSIS — E66.811 CLASS 1 OBESITY DUE TO EXCESS CALORIES WITHOUT SERIOUS COMORBIDITY WITH BODY MASS INDEX (BMI) OF 33.0 TO 33.9 IN ADULT: ICD-10-CM

## 2025-04-11 DIAGNOSIS — R41.840 DIFFICULTY CONCENTRATING: ICD-10-CM

## 2025-04-11 PROCEDURE — 3008F BODY MASS INDEX DOCD: CPT | Performed by: NURSE PRACTITIONER

## 2025-04-11 PROCEDURE — 99214 OFFICE O/P EST MOD 30 MIN: CPT | Performed by: NURSE PRACTITIONER

## 2025-04-11 RX ORDER — ESCITALOPRAM OXALATE 5 MG/1
5 TABLET ORAL DAILY
Qty: 90 TABLET | Refills: 3 | Status: SHIPPED | OUTPATIENT
Start: 2025-04-11

## 2025-04-11 ASSESSMENT — PATIENT HEALTH QUESTIONNAIRE - PHQ9
1. LITTLE INTEREST OR PLEASURE IN DOING THINGS: SEVERAL DAYS
SUM OF ALL RESPONSES TO PHQ9 QUESTIONS 1 AND 2: 2
2. FEELING DOWN, DEPRESSED OR HOPELESS: SEVERAL DAYS

## 2025-04-11 NOTE — PROGRESS NOTES
"Subjective   Patient ID: Al Nuñez is a 30 y.o. female who presents for Follow-up (Follow up   6 Months ).    HPI   Al returns for anxiety follow up.     Anxiety/depression: She is doing okay on lexapro, however she feels that her job is stressing her out and causing more anxiety. She has decided to quit her job to see if that will help with her stress. She feels she is more irritable, garcia, has brain fog, has difficulty concentrating on things.     Is wondering if she has ADHD and how she can go about finding out if she has this.      Review of Systems   Constitutional:  Negative for fatigue.   Respiratory:  Negative for chest tightness and shortness of breath.    Cardiovascular:  Negative for chest pain, palpitations and leg swelling.   Gastrointestinal:  Negative for abdominal pain, blood in stool, constipation, diarrhea, nausea and vomiting.   Genitourinary:  Negative for dysuria.   Musculoskeletal:  Negative for arthralgias and myalgias.   Skin:  Negative for color change.   Neurological:  Negative for dizziness, light-headedness and headaches.   Psychiatric/Behavioral:  Positive for agitation (irritable) and decreased concentration. Negative for dysphoric mood, self-injury and suicidal ideas. The patient is nervous/anxious. The patient is not hyperactive.        Objective   /80   Pulse 71   Ht 1.676 m (5' 6\")   Wt 94.4 kg (208 lb 3 oz)   BMI 33.60 kg/m²     Physical Exam  Vitals and nursing note reviewed.   Constitutional:       Appearance: Normal appearance.   Cardiovascular:      Rate and Rhythm: Normal rate.      Pulses: Normal pulses.   Pulmonary:      Effort: Pulmonary effort is normal.   Neurological:      General: No focal deficit present.      Mental Status: She is alert and oriented to person, place, and time.   Psychiatric:         Mood and Affect: Mood normal.         Behavior: Behavior normal.         Thought Content: Thought content normal.         Judgment: Judgment normal. "         Assessment/Plan   Problem List Items Addressed This Visit             ICD-10-CM    Generalized anxiety disorder - Primary F41.1     Continue lexapro 5 mg daily   Continue  Buspirone 5 mg TID as needed         Relevant Medications    escitalopram (Lexapro) 5 mg tablet    buPROPion (Wellbutrin) 75 mg tablet    Persistent depressive disorder F34.1     Continue with escitalopram 5 mg daily   Add wellbutrin 75 mg daily         Class 1 obesity due to excess calories without serious comorbidity with body mass index (BMI) of 33.0 to 33.9 in adult E66.811, E66.09, Z68.33    Difficulty concentrating R41.840     Start wellbutrin 75 mg daily.  Follow up in 3+ weeks for medication check.             Relevant Medications    buPROPion (Wellbutrin) 75 mg tablet        We discussed changing SSRI, and/or addition of Wellbutrin to current SSRI. We discussed that she will need to get a clinical diagnosis from licensed mental health professional before we further treatment for ADD if she fails Wellbutrin.     For any new medications that were prescribed today, the patient was educated about their indications for use, administration, frequency and potential side effects of the medication.

## 2025-04-12 PROBLEM — R41.840 DIFFICULTY CONCENTRATING: Status: ACTIVE | Noted: 2025-04-12

## 2025-04-12 RX ORDER — BUPROPION HYDROCHLORIDE 75 MG/1
75 TABLET ORAL DAILY
Qty: 30 TABLET | Refills: 1 | Status: SHIPPED | OUTPATIENT
Start: 2025-04-12

## 2025-04-12 ASSESSMENT — ENCOUNTER SYMPTOMS
DYSURIA: 0
NERVOUS/ANXIOUS: 1
HEADACHES: 0
MYALGIAS: 0
CHEST TIGHTNESS: 0
ABDOMINAL PAIN: 0
LIGHT-HEADEDNESS: 0
AGITATION: 1
CONSTIPATION: 0
DIARRHEA: 0
DYSPHORIC MOOD: 0
COLOR CHANGE: 0
SHORTNESS OF BREATH: 0
HYPERACTIVE: 0
VOMITING: 0
DECREASED CONCENTRATION: 1
DIZZINESS: 0
NAUSEA: 0
PALPITATIONS: 0
BLOOD IN STOOL: 0
FATIGUE: 0
ARTHRALGIAS: 0

## 2025-04-22 ENCOUNTER — TELEPHONE (OUTPATIENT)
Age: 30
End: 2025-04-22

## 2025-04-22 NOTE — TELEPHONE ENCOUNTER
Al called in She started the Wellbutrin and has been taking the Lexapro every other day trying to whine off the Lexapro .  She is asking if this is the correct way to do that

## 2025-06-17 DIAGNOSIS — R41.840 DIFFICULTY CONCENTRATING: ICD-10-CM

## 2025-06-17 DIAGNOSIS — F41.1 GENERALIZED ANXIETY DISORDER: ICD-10-CM

## 2025-06-17 RX ORDER — BUPROPION HYDROCHLORIDE 75 MG/1
75 TABLET ORAL DAILY
Qty: 90 TABLET | Refills: 1 | Status: SHIPPED | OUTPATIENT
Start: 2025-06-17

## 2025-06-23 ENCOUNTER — APPOINTMENT (OUTPATIENT)
Dept: URGENT CARE | Facility: CLINIC | Age: 30
End: 2025-06-23
Payer: COMMERCIAL

## 2026-04-10 ENCOUNTER — APPOINTMENT (OUTPATIENT)
Dept: PRIMARY CARE | Facility: CLINIC | Age: 31
End: 2026-04-10
Payer: COMMERCIAL